# Patient Record
Sex: MALE | Race: WHITE | NOT HISPANIC OR LATINO
[De-identification: names, ages, dates, MRNs, and addresses within clinical notes are randomized per-mention and may not be internally consistent; named-entity substitution may affect disease eponyms.]

---

## 2019-01-17 ENCOUNTER — APPOINTMENT (OUTPATIENT)
Dept: PLASTIC SURGERY | Facility: CLINIC | Age: 64
End: 2019-01-17

## 2019-01-17 ENCOUNTER — OUTPATIENT (OUTPATIENT)
Dept: OUTPATIENT SERVICES | Facility: HOSPITAL | Age: 64
LOS: 1 days | End: 2019-01-17

## 2019-01-17 VITALS
DIASTOLIC BLOOD PRESSURE: 55 MMHG | TEMPERATURE: 97.5 F | HEART RATE: 65 BPM | RESPIRATION RATE: 14 BRPM | BODY MASS INDEX: 24.62 KG/M2 | HEIGHT: 70 IN | SYSTOLIC BLOOD PRESSURE: 106 MMHG | WEIGHT: 172 LBS

## 2019-01-17 DIAGNOSIS — Z78.9 OTHER SPECIFIED HEALTH STATUS: ICD-10-CM

## 2019-01-17 PROBLEM — Z00.00 ENCOUNTER FOR PREVENTIVE HEALTH EXAMINATION: Status: ACTIVE | Noted: 2019-01-17

## 2019-01-17 RX ORDER — TERBINAFINE HYDROCHLORIDE 250 MG/1
250 TABLET ORAL
Refills: 0 | Status: ACTIVE | COMMUNITY

## 2019-01-17 RX ORDER — MULTIVIT WITH IRON,MINERALS
TABLET ORAL
Refills: 0 | Status: ACTIVE | COMMUNITY

## 2019-01-17 NOTE — ASSESSMENT
[FreeTextEntry1] : Mr. Brink has global aging of the face and neck. I discussed findings with the patient. I recommend face and neck lift with upper and lower lid blepharoplasty. Risks and benefits of surgery were explained. I have given him my card. I will contact him for follow up of any questions and concerns.

## 2019-01-17 NOTE — HISTORY OF PRESENT ILLNESS
[FreeTextEntry1] : Mr. Brink is a 62yo male who comes to clinic with concerns of facial aging. He states that over the past few years he has noticed sagging of his face. He states that his upper eyelids have loose skin, lower lids with bags,heavy lower face, and also some sagging and bands of his neck. he has been seeing a dermatologist and getting microneedling and RF treatment with some modest improvement. He is here to explore surgical options. He wears glasses but has no history of eye surgery or dry eyes.  Has had fat grafting of the penis no other cosmetic surgery.

## 2019-01-17 NOTE — PHYSICAL EXAM
[NI] : Normal [de-identified] : receding hairline, forehead with heavy rhytids. Brows at level of orbital rim [de-identified] : upper and lower lid margins in appropriate position. no scleral show. upper lids with severe dermatochalasis. neutral canthal tilt. positive vector. lower lid with brisk snap back. 4-5 mm lid distraction. large malar bags. [de-identified] : heavy nasolabial folds. mid face loss of volume, soft tissue descent.marionette lines and jowls present.  ill defined mandible border [de-identified] : blunted cervicomental angle, platysmal bands, loose skin

## 2019-01-18 DIAGNOSIS — Z01.89 ENCOUNTER FOR OTHER SPECIFIED SPECIAL EXAMINATIONS: ICD-10-CM

## 2019-02-14 ENCOUNTER — APPOINTMENT (OUTPATIENT)
Dept: PLASTIC SURGERY | Facility: CLINIC | Age: 64
End: 2019-02-14

## 2019-02-14 ENCOUNTER — OUTPATIENT (OUTPATIENT)
Dept: OUTPATIENT SERVICES | Facility: HOSPITAL | Age: 64
LOS: 1 days | End: 2019-02-14

## 2019-02-14 NOTE — PHYSICAL EXAM
[NI] : Normal [de-identified] : receding hairline, forehead with heavy rhytids. Brows at level of orbital rim [de-identified] : upper and lower lid margins in appropriate position. no scleral show. upper lids with severe dermatochalasis. neutral canthal tilt. positive vector. lower lid with brisk snap back. 4-5 mm lid distraction. large malar bags. [de-identified] : heavy nasolabial folds. mid face loss of volume, soft tissue descent.marionette lines and jowls present.  ill defined mandible border [de-identified] : blunted cervicomental angle, platysmal bands, loose skin

## 2019-02-14 NOTE — HISTORY OF PRESENT ILLNESS
[FreeTextEntry1] : Mr. Brink is a 62yo male who is here for preop for upcoming surgery face lift neck lift and quad bleph. He has been doing well since last visit and has no changes to his health.

## 2019-02-14 NOTE — ASSESSMENT
[FreeTextEntry1] : Mr. Brink is here for preop. Surgical plan was discussed and risks and benefits were reviewed. All questions and concerns were addressed. Photo consents obtained. He will proceed with remainder of surgical clearance and I will see him back for surgery.

## 2019-02-20 DIAGNOSIS — Z01.812 ENCOUNTER FOR PREPROCEDURAL LABORATORY EXAMINATION: ICD-10-CM

## 2019-03-07 RX ORDER — DOCUSATE SODIUM 100 MG
1 CAPSULE ORAL
Qty: 5 | Refills: 0 | OUTPATIENT
Start: 2019-03-07 | End: 2019-03-11

## 2019-03-07 RX ORDER — CEPHALEXIN 500 MG
1 CAPSULE ORAL
Qty: 14 | Refills: 0 | OUTPATIENT
Start: 2019-03-07 | End: 2019-03-13

## 2019-03-11 ENCOUNTER — OUTPATIENT (OUTPATIENT)
Dept: OUTPATIENT SERVICES | Facility: HOSPITAL | Age: 64
LOS: 1 days | Discharge: HOME | End: 2019-03-11

## 2019-03-12 ENCOUNTER — OTHER (OUTPATIENT)
Age: 64
End: 2019-03-12

## 2019-03-12 NOTE — HISTORY OF PRESENT ILLNESS
[FreeTextEntry1] : Mr. Brink is a 63yo male POD1 from facelift, necklift, upper and lower lid blepharoplasty. Did well overnight. No pain. BP stable. no complaints.

## 2019-03-12 NOTE — ASSESSMENT
[FreeTextEntry1] : Mr. Brink is a 63yo male POD1 from facelift, necklift, upper and lower lid blepharoplasty. He is doing well right now. Drains discontinued. Wound care and activity levels reviewed. Follow up 1 week. Ok to go home now.

## 2019-03-12 NOTE — PHYSICAL EXAM
[NI] : Normal [de-identified] : swelling, some ecchymosis of lower lids. incisions clean dry intact. [de-identified] : face with improved contour. swelling and mild ecchymosis of skin. no fluid collection. facial nerve intact. incisions clean dry intact. [de-identified] : moderate swelling. good contour of neck. no fluid collection. drains with minimal serosanguinous output.

## 2019-03-19 ENCOUNTER — APPOINTMENT (OUTPATIENT)
Dept: PLASTIC SURGERY | Facility: CLINIC | Age: 64
End: 2019-03-19

## 2019-03-19 ENCOUNTER — OUTPATIENT (OUTPATIENT)
Dept: OUTPATIENT SERVICES | Facility: HOSPITAL | Age: 64
LOS: 1 days | End: 2019-03-19

## 2019-03-19 VITALS — SYSTOLIC BLOOD PRESSURE: 122 MMHG | RESPIRATION RATE: 15 BRPM | HEART RATE: 86 BPM | DIASTOLIC BLOOD PRESSURE: 72 MMHG

## 2019-03-19 NOTE — PHYSICAL EXAM
[NI] : Normal [de-identified] : incisions clean dry intact. both upper and lower lids swollen. [de-identified] : incisions clean dry intact, improved contour of face. no fluid collections or hematomas. facial nerve intact. [de-identified] : improved contour of neck, no fluid collections. incisions clean dry intact

## 2019-03-19 NOTE — ASSESSMENT
[FreeTextEntry1] : Mr. Brink is a 63yo male s/p facelift, necklift, and quad bleph. he is 1 week out from surgery and doing well. sutures and staples discontinued. Wound care and activity levels reviewed. Follow up in 3 weeks.

## 2019-03-19 NOTE — HISTORY OF PRESENT ILLNESS
[FreeTextEntry1] : Mr. Brink is a 65yo male who comes to the plastic surgery clinic 1 week removed from face and neck lift, upper and lower bleph. He has been doing well postoperatively. Pain has been under control. No issues with vision. States that he went to the ER due to constipation which has resolved.

## 2019-03-20 DIAGNOSIS — Z09 ENCOUNTER FOR FOLLOW-UP EXAMINATION AFTER COMPLETED TREATMENT FOR CONDITIONS OTHER THAN MALIGNANT NEOPLASM: ICD-10-CM

## 2019-04-11 ENCOUNTER — APPOINTMENT (OUTPATIENT)
Dept: PLASTIC SURGERY | Facility: CLINIC | Age: 64
End: 2019-04-11

## 2019-04-11 ENCOUNTER — OUTPATIENT (OUTPATIENT)
Dept: OUTPATIENT SERVICES | Facility: HOSPITAL | Age: 64
LOS: 1 days | End: 2019-04-11

## 2019-04-11 VITALS
SYSTOLIC BLOOD PRESSURE: 122 MMHG | HEART RATE: 70 BPM | TEMPERATURE: 97 F | RESPIRATION RATE: 14 BRPM | DIASTOLIC BLOOD PRESSURE: 57 MMHG

## 2019-04-11 NOTE — PHYSICAL EXAM
[NI] : Normal [de-identified] : incisions well healed. improved contour of face. improvement in cheek projection and decreased folds [de-identified] : incisions well healed. both upper and lower lids improved swelling. [de-identified] : improved contour of neck, and mandible

## 2019-04-11 NOTE — ASSESSMENT
[FreeTextEntry1] : Patient doing very well at this time. Extremely pleased with results. Released from all restrictions. Patient to get postop photos in 2 weeks. Continue to massage scars. Follow up as needed.

## 2019-04-11 NOTE — HISTORY OF PRESENT ILLNESS
[FreeTextEntry1] : Mr. Brink is a 63yo male who is 1 month removed from facelift, neck lift, upper and lower bleph. Doing well. no complaints at this time.very please with results

## 2019-04-12 DIAGNOSIS — Z09 ENCOUNTER FOR FOLLOW-UP EXAMINATION AFTER COMPLETED TREATMENT FOR CONDITIONS OTHER THAN MALIGNANT NEOPLASM: ICD-10-CM

## 2019-10-03 ENCOUNTER — APPOINTMENT (OUTPATIENT)
Dept: PLASTIC SURGERY | Facility: CLINIC | Age: 64
End: 2019-10-03

## 2019-10-03 ENCOUNTER — OUTPATIENT (OUTPATIENT)
Dept: OUTPATIENT SERVICES | Facility: HOSPITAL | Age: 64
LOS: 1 days | End: 2019-10-03

## 2019-10-03 NOTE — PROCEDURE
[Nl] : None [FreeTextEntry6] : Timeout performed. \par Area prepped with alcohol wipe. \par Landmarks well visualized and baseline asymmetries noted. \par \par Botox Injected carefully to Forehead, Glabella, and Crow's Feet. \par Patient tolerated the procedure well. \par Post-procedure activity restrictions reiterated to patient. \par Icepack provided.  \par  [FreeTextEntry1] : Facial Aging- Rhytids [FreeTextEntry2] : Neuromodulator Injection to Face

## 2019-10-03 NOTE — ASSESSMENT
[FreeTextEntry1] : 64M presents for neuromodulator injection to face for facial rejuvenation. \par 50U of Botox injected to Forehead/Glabella Complex/Crow's feet. \par \par Patient tolerated procedure well.\par (See procedure note above for further details)\par

## 2019-10-03 NOTE — PROCEDURE
[Nl] : None [FreeTextEntry1] : Facial Aging- Rhytids [FreeTextEntry6] : Timeout performed. \par Area prepped with alcohol wipe. \par Landmarks well visualized and baseline asymmetries noted. \par \par Botox Injected carefully to Forehead, Glabella, and Crow's Feet. \par Patient tolerated the procedure well. \par Post-procedure activity restrictions reiterated to patient. \par Icepack provided.  \par  [FreeTextEntry2] : Neuromodulator Injection to Face

## 2019-10-04 DIAGNOSIS — Z09 ENCOUNTER FOR FOLLOW-UP EXAMINATION AFTER COMPLETED TREATMENT FOR CONDITIONS OTHER THAN MALIGNANT NEOPLASM: ICD-10-CM

## 2022-04-13 ENCOUNTER — APPOINTMENT (OUTPATIENT)
Dept: PLASTIC SURGERY | Facility: CLINIC | Age: 67
End: 2022-04-13